# Patient Record
Sex: FEMALE | Race: WHITE
[De-identification: names, ages, dates, MRNs, and addresses within clinical notes are randomized per-mention and may not be internally consistent; named-entity substitution may affect disease eponyms.]

---

## 2022-08-27 ENCOUNTER — HOSPITAL ENCOUNTER (INPATIENT)
Dept: HOSPITAL 12 - ER | Age: 57
LOS: 11 days | Discharge: HOME | DRG: 885 | End: 2022-09-07
Payer: MEDICARE

## 2022-08-27 VITALS — WEIGHT: 135 LBS | BODY MASS INDEX: 22.49 KG/M2 | HEIGHT: 65 IN

## 2022-08-27 DIAGNOSIS — E03.9: ICD-10-CM

## 2022-08-27 DIAGNOSIS — F29: ICD-10-CM

## 2022-08-27 DIAGNOSIS — B36.8: ICD-10-CM

## 2022-08-27 DIAGNOSIS — F17.210: ICD-10-CM

## 2022-08-27 DIAGNOSIS — G43.909: ICD-10-CM

## 2022-08-27 DIAGNOSIS — G89.29: ICD-10-CM

## 2022-08-27 DIAGNOSIS — Z59.00: ICD-10-CM

## 2022-08-27 DIAGNOSIS — F19.10: ICD-10-CM

## 2022-08-27 DIAGNOSIS — F15.10: ICD-10-CM

## 2022-08-27 DIAGNOSIS — I10: ICD-10-CM

## 2022-08-27 DIAGNOSIS — F25.0: Primary | ICD-10-CM

## 2022-08-27 DIAGNOSIS — N17.9: ICD-10-CM

## 2022-08-27 SDOH — ECONOMIC STABILITY - HOUSING INSECURITY: HOMELESSNESS UNSPECIFIED: Z59.00

## 2022-08-28 VITALS — SYSTOLIC BLOOD PRESSURE: 128 MMHG | DIASTOLIC BLOOD PRESSURE: 75 MMHG

## 2022-08-28 VITALS — DIASTOLIC BLOOD PRESSURE: 81 MMHG | SYSTOLIC BLOOD PRESSURE: 117 MMHG

## 2022-08-28 VITALS — SYSTOLIC BLOOD PRESSURE: 102 MMHG | DIASTOLIC BLOOD PRESSURE: 66 MMHG

## 2022-08-28 LAB
ALP SERPL-CCNC: 87 U/L (ref 50–136)
ALT SERPL W/O P-5'-P-CCNC: 24 U/L (ref 14–59)
AST SERPL-CCNC: 24 U/L (ref 15–37)
BILIRUB SERPL-MCNC: 0.2 MG/DL (ref 0.2–1)
BUN SERPL-MCNC: 28 MG/DL (ref 7–18)
CHLORIDE SERPL-SCNC: 101 MMOL/L (ref 98–107)
CO2 SERPL-SCNC: 33 MMOL/L (ref 21–32)
CREAT SERPL-MCNC: 1 MG/DL (ref 0.6–1.3)
GLUCOSE SERPL-MCNC: 99 MG/DL (ref 74–106)
HCT VFR BLD AUTO: 34.6 % (ref 31.2–41.9)
MCH RBC QN AUTO: 29.8 UUG (ref 24.7–32.8)
MCV RBC AUTO: 89.4 FL (ref 75.5–95.3)
PLATELET # BLD AUTO: 221 K/UL (ref 179–408)
POTASSIUM SERPL-SCNC: 3.8 MMOL/L (ref 3.5–5.1)
WS STN SPEC: 7.3 G/DL (ref 6.4–8.2)

## 2022-08-28 RX ADMIN — NICOTINE SCH MG: 21 PATCH, EXTENDED RELEASE TOPICAL at 09:12

## 2022-08-28 RX ADMIN — DIVALPROEX SODIUM SCH MG: 250 TABLET, EXTENDED RELEASE ORAL at 17:00

## 2022-08-28 RX ADMIN — LORAZEPAM PRN MG: 1 TABLET ORAL at 22:05

## 2022-08-28 RX ADMIN — ALUMINUM HYDROXIDE, MAGNESIUM HYDROXIDE, AND SIMETHICONE PRN ML: 200; 200; 20 SUSPENSION ORAL at 14:29

## 2022-08-28 RX ADMIN — LORAZEPAM PRN MG: 1 TABLET ORAL at 09:12

## 2022-08-28 RX ADMIN — ACETAMINOPHEN PRN MG: 325 TABLET ORAL at 22:05

## 2022-08-28 RX ADMIN — LORAZEPAM PRN MG: 1 TABLET ORAL at 14:28

## 2022-08-28 RX ADMIN — OLANZAPINE SCH MG: 5 TABLET ORAL at 17:00

## 2022-08-28 RX ADMIN — LORAZEPAM PRN MG: 1 TABLET ORAL at 01:29

## 2022-08-28 NOTE — NUR
Gps/Lvn- Patient refused to take her depakote  ER, as well as Zyprexa , reviewed with 
patient patient stated" i dont take any of those meds. ". i take lamictal for my mood " .
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
## 2022-08-28 NOTE — NUR
Gps/Lvn- Stayed in the activity room during lunch. Showered self ind. after set up, 
instructed to wash right side of neck wound wit soap and water, rinse well. Complained of 
pain over the right lateral side neck wound, offered tylenol  650 mg po, refused. Tends to 
to be argumentative, prompted to take her meds. Patient was placed to the seclusion  room 
temporarily r/t roommate  with > agitation . Patient claimed she needed to rest. nicotine 
patch  applied to right deltoid area, hesitancy noted.Ativan 1 mg po was administered for 
anxiety .

## 2022-08-28 NOTE — NUR
Gps/Lvn- Able to nap for 2 hours , woke up for lunch , eating large portions of food, 
requesting for more. Emotionally labile crying spells noted while talking to patient.

## 2022-08-28 NOTE — NUR
RECEIVED PATIENT IN HER ROOM SLEEPING IN BED. SHE IS A/O X 2, EASILY AROUSABLE; HOWEVER, SHE 
IS NOTED EASILY IRRITABLE, APPEARS DEPRESSED. SHE IS A POOR HISTORIAN. SHE IS UNCOOPERATIVE 
WITH ASSESSMENT. HER V/S ARE STABLE. SHE IS IN NO DISTRESS. PATIENT IS REASSURED FOR HER 
SAFETY, SAFETY AND FALL PRECAUTION ARE IN PLACE. SHE REFUSED SNACKS AND PO FLUIDS. WILL 
CONTINUE TO MONITOR,

## 2022-08-28 NOTE — NUR
Gps/Lvn- Needy, crying spells, demanding to give her  topamax for her migraine H/A , Sayra 
NP was  texted , awaiting return call. Patient also complained of upset stomach mylanta 30 
ml was given po. Patient constantly talking, hyper verbal. .demanding behavior , difficulty 
redirecting patient. patient requesting to use wheel chair  to roam around claimed back and 
shoulder had been bothering her Patient likes to be called "Rochele"

## 2022-08-28 NOTE — NUR
PATIENT APPROACHED THE NURSING STATION, SHE WAS NOTED CRYING, SHE STATED, "I NEED SOMETHING 
FOR PAIN. IT HURTS ALL OVER". PATIENT WAS REASSURED. SHE WAS GIVEN TYLENOL 650MG PO PRN AND 
ATIVAN 1MG PO PRN. WOUND NOTED ON RIGHT SIDE OF NECK PRESENT ON ADMISSION. NO DISCHARGED, NO 
SWELLING WAS NOTED. AWAITING FOR A WOUND CONSUL. SHE WAS ALSO GIVEN PO FLUIDS AND SNACKS. 
SHE WAS REASSURED FOR HER SAFETY. SAFETY AND FALL PRECAUTIONS ARE IN PLACE. WILL CONTINUE TO 
MONITOR.

## 2022-08-29 VITALS — DIASTOLIC BLOOD PRESSURE: 55 MMHG | SYSTOLIC BLOOD PRESSURE: 135 MMHG

## 2022-08-29 VITALS — DIASTOLIC BLOOD PRESSURE: 79 MMHG | SYSTOLIC BLOOD PRESSURE: 163 MMHG

## 2022-08-29 VITALS — SYSTOLIC BLOOD PRESSURE: 143 MMHG | DIASTOLIC BLOOD PRESSURE: 84 MMHG

## 2022-08-29 RX ADMIN — ARIPIPRAZOLE SCH MG: 5 TABLET ORAL at 13:23

## 2022-08-29 RX ADMIN — DIVALPROEX SODIUM SCH MG: 250 TABLET, EXTENDED RELEASE ORAL at 08:48

## 2022-08-29 RX ADMIN — ALUMINUM HYDROXIDE, MAGNESIUM HYDROXIDE, AND SIMETHICONE PRN ML: 200; 200; 20 SUSPENSION ORAL at 04:09

## 2022-08-29 RX ADMIN — NICOTINE SCH MG: 21 PATCH, EXTENDED RELEASE TOPICAL at 08:48

## 2022-08-29 RX ADMIN — ARIPIPRAZOLE SCH MG: 5 TABLET ORAL at 10:27

## 2022-08-29 RX ADMIN — ARIPIPRAZOLE SCH MG: 5 TABLET ORAL at 17:20

## 2022-08-29 RX ADMIN — OLANZAPINE SCH MG: 5 TABLET ORAL at 08:48

## 2022-08-29 RX ADMIN — LAMOTRIGINE SCH MG: 25 TABLET ORAL at 20:58

## 2022-08-29 NOTE — NUR
RECEIVED PATIENT IN HER ROOM IN BED. SHE IS NOTED SLEEPING BUT EASILY AROUSABLE. SHE IS A/O 
X 2. SHE IS NOTED EASILY IRRITABLE, AFFECT IS BLUNTED, MOOD IS LOW. SHE IS A POOR HISTORIAN. 
PATIENT IS NOTED WITH POOR INSIGHT AND JUDGMENT AS TO THE REASON FOR HER ADMISSION TO [facility]HU. 
SHE WAS ABLE TO COMPLY WITH MEDICATION REGIMENT (LAMICTAL). V/S STABLE. B/P A BIT ELEVATED 
SPB 163MMHG. HOWEVER, PATIENT IS REFUSING TO HAVE HER \B/P RECHECKS AND SHE IS REFUSING 
PRNs. PT IS IN NO DISTRESS. WILL TRY LATER. SHE IS REASSURED FOR HER SAFETY. SAFETY AND FALL 
PRECAUTION ARE IN PLACE. SHE WAS GIVEN PO FLUIDS AND SNACKS. WILL CONTINUE TO MONITOR 
CLOSELY,

## 2022-08-29 NOTE — NUR
GPS: Nursing Notes: Mood Disturbance: Labile:

Patient is awake and responding to her name, refusing her medications this AM, poor anger 
management, unpredictable behavior, shouting to staff "I will take what the fuck I want..I 
don't want those pills..", redirected during shift, gets easily irritable and angry when 
redirected, poor impulse control, argumentative, resistant with nursing care, episodes of 
pacing the hallway, verbal abusive toward staff at times, continue to monitor for safety, 
continue with treatment plan.

## 2022-08-29 NOTE — NUR
GPS: Nursing Notes: 5250 Hearing Request:

Staff gave copy of 5250 to patient. Explained 5250 and informed patient that a certification 
review hearing will held within four days. Patient was inform that patient's right advocate 
will talk to her to provide assistance in preparing for the hearing or to answer questions 
or to provide other assistance. The court has been notified of this certification on this 
day via [facility] portal.

## 2022-08-30 VITALS — DIASTOLIC BLOOD PRESSURE: 77 MMHG | SYSTOLIC BLOOD PRESSURE: 118 MMHG

## 2022-08-30 VITALS — SYSTOLIC BLOOD PRESSURE: 137 MMHG | DIASTOLIC BLOOD PRESSURE: 84 MMHG

## 2022-08-30 VITALS — SYSTOLIC BLOOD PRESSURE: 111 MMHG | DIASTOLIC BLOOD PRESSURE: 68 MMHG

## 2022-08-30 RX ADMIN — LORAZEPAM PRN MG: 1 TABLET ORAL at 15:28

## 2022-08-30 RX ADMIN — ARIPIPRAZOLE SCH MG: 5 TABLET ORAL at 17:07

## 2022-08-30 RX ADMIN — LORAZEPAM PRN MG: 1 TABLET ORAL at 08:24

## 2022-08-30 RX ADMIN — CLOTRIMAZOLE AND BETAMETHASONE DIPROPIONATE SCH GM: 10; .5 CREAM TOPICAL at 21:00

## 2022-08-30 RX ADMIN — ARIPIPRAZOLE SCH MG: 5 TABLET ORAL at 12:33

## 2022-08-30 RX ADMIN — NICOTINE SCH MG: 21 PATCH, EXTENDED RELEASE TOPICAL at 08:24

## 2022-08-30 RX ADMIN — CLOTRIMAZOLE AND BETAMETHASONE DIPROPIONATE SCH GM: 10; .5 CREAM TOPICAL at 12:35

## 2022-08-30 RX ADMIN — LAMOTRIGINE SCH MG: 25 TABLET ORAL at 22:01

## 2022-08-30 RX ADMIN — ARIPIPRAZOLE SCH MG: 5 TABLET ORAL at 08:24

## 2022-08-30 RX ADMIN — ACETAMINOPHEN PRN MG: 325 TABLET ORAL at 07:24

## 2022-08-30 NOTE — NUR
ASAD Initial Discharge Plan:



Pt does not have an address available at this time. ASAD will contact pt's , Morris 
([phone]) and discuss pt's discharge plan. ASAD will 

continue to work with pt, family and MD to ensure a safe and proper discharge plan.

## 2022-08-30 NOTE — NUR
WOUND CARE CONSULT; PT SEEN FOR LESION TO RT SIDE OF NECK WITH PEELING SKIN, PRESENT ON 
ADMISSION. PT REPORTS ITCHING TO AREA. RECOMMENDATIONS MADE FOR SKIN CARE OF LESION. 
DISCUSSED WITH NURSING STAFF. MD IN AGREEMENT WITH PLAN OF CARE.

## 2022-08-31 VITALS — DIASTOLIC BLOOD PRESSURE: 71 MMHG | SYSTOLIC BLOOD PRESSURE: 122 MMHG

## 2022-08-31 VITALS — SYSTOLIC BLOOD PRESSURE: 132 MMHG | DIASTOLIC BLOOD PRESSURE: 69 MMHG

## 2022-08-31 VITALS — SYSTOLIC BLOOD PRESSURE: 145 MMHG | DIASTOLIC BLOOD PRESSURE: 82 MMHG

## 2022-08-31 RX ADMIN — LORAZEPAM PRN MG: 1 TABLET ORAL at 12:51

## 2022-08-31 RX ADMIN — ARIPIPRAZOLE SCH MG: 5 TABLET ORAL at 17:08

## 2022-08-31 RX ADMIN — LORAZEPAM PRN MG: 1 TABLET ORAL at 06:18

## 2022-08-31 RX ADMIN — CLOTRIMAZOLE AND BETAMETHASONE DIPROPIONATE SCH GM: 10; .5 CREAM TOPICAL at 08:27

## 2022-08-31 RX ADMIN — ARIPIPRAZOLE SCH MG: 5 TABLET ORAL at 08:25

## 2022-08-31 RX ADMIN — CLOTRIMAZOLE AND BETAMETHASONE DIPROPIONATE SCH GM: 10; .5 CREAM TOPICAL at 20:20

## 2022-08-31 RX ADMIN — ARIPIPRAZOLE SCH MG: 5 TABLET ORAL at 12:47

## 2022-08-31 RX ADMIN — LAMOTRIGINE SCH MG: 25 TABLET ORAL at 20:20

## 2022-08-31 RX ADMIN — NICOTINE SCH MG: 21 PATCH, EXTENDED RELEASE TOPICAL at 08:25

## 2022-08-31 NOTE — NUR
PATIENT'S RASH/LESION ON RT SIDE OF NECK WITH PEELING SKIN, PRESENT ON ADMISSION, IS BEEN 
TREATED WITH LOTRISONE CREAM. IT APPEARS TO BE HEALING WELL. PATIENT DENIED ITCHING, PAIN OR 
DISCOMFORT. NO SWELLING NO DISCHARGED AND NO S/S OF INFECTION IS NOTED AT THIS TIME. PATIENT 
IS COMPLIANT WITH LOTRISONE CREAM Q12HRS. SHE TOLERATED WELL/ WILL CONTINUE TO MONITOR.

## 2022-08-31 NOTE — NUR
RECEIVED PATIENT IN HER ROOM IN BED. SHE IS NOTED SLEEPING BUT EASILY AROUSABLE. SHE IS 
NOTED A/O X 2. PATIENT APPEARS DEPRESSED. UPON INTERVIEW, SHE DENIED SI/HI/VI/AH SHE WAS 
ABLE TO VERBALLY CFS. SHE STATED, "I DON'T WANT TO HARM MYSELF OR DIE, I JUST WANT TO SLEEP. 
I AM TIRED, I JUST WANT TO SLEEP". PATIENT WAS REASSURED FOR HER SAFETY. SAFETY AND FALL 
PRECAUTIONS ARE IN PLACE. SHE WAS GIVEN PO FLUIDS AND SNACKS. HER V/S ARE STABLE. SHE IS IN 
NO DISTRESS. WILL CONTINUE TO MONITOR.

## 2022-09-01 VITALS — DIASTOLIC BLOOD PRESSURE: 76 MMHG | SYSTOLIC BLOOD PRESSURE: 124 MMHG

## 2022-09-01 VITALS — SYSTOLIC BLOOD PRESSURE: 118 MMHG | DIASTOLIC BLOOD PRESSURE: 72 MMHG

## 2022-09-01 VITALS — SYSTOLIC BLOOD PRESSURE: 110 MMHG | DIASTOLIC BLOOD PRESSURE: 75 MMHG

## 2022-09-01 RX ADMIN — CLOTRIMAZOLE AND BETAMETHASONE DIPROPIONATE SCH GM: 10; .5 CREAM TOPICAL at 20:42

## 2022-09-01 RX ADMIN — Medication SCH ML: at 12:17

## 2022-09-01 RX ADMIN — ARIPIPRAZOLE SCH MG: 5 TABLET ORAL at 12:55

## 2022-09-01 RX ADMIN — LORAZEPAM PRN MG: 1 TABLET ORAL at 10:27

## 2022-09-01 RX ADMIN — CLOTRIMAZOLE AND BETAMETHASONE DIPROPIONATE SCH GM: 10; .5 CREAM TOPICAL at 09:24

## 2022-09-01 RX ADMIN — ARIPIPRAZOLE SCH MG: 5 TABLET ORAL at 16:49

## 2022-09-01 RX ADMIN — LAMOTRIGINE SCH MG: 25 TABLET ORAL at 20:42

## 2022-09-01 RX ADMIN — ARIPIPRAZOLE SCH MG: 5 TABLET ORAL at 09:21

## 2022-09-01 RX ADMIN — NICOTINE SCH MG: 21 PATCH, EXTENDED RELEASE TOPICAL at 09:21

## 2022-09-01 NOTE — NUR
Gps/Lvn- Lesions/rashes ro right side of her neck improving, granulations noted, antifungal 
cream applied as ordered, compliant with meds, making her simple needs known, cooperative, 
rediirectable, encouraged continue participation in her group tx. interacting with her 
roommate

## 2022-09-02 VITALS — SYSTOLIC BLOOD PRESSURE: 121 MMHG | DIASTOLIC BLOOD PRESSURE: 64 MMHG

## 2022-09-02 VITALS — DIASTOLIC BLOOD PRESSURE: 71 MMHG | SYSTOLIC BLOOD PRESSURE: 108 MMHG

## 2022-09-02 RX ADMIN — CLOTRIMAZOLE AND BETAMETHASONE DIPROPIONATE SCH GM: 10; .5 CREAM TOPICAL at 20:19

## 2022-09-02 RX ADMIN — CLOTRIMAZOLE AND BETAMETHASONE DIPROPIONATE SCH GM: 10; .5 CREAM TOPICAL at 08:56

## 2022-09-02 RX ADMIN — NICOTINE SCH MG: 21 PATCH, EXTENDED RELEASE TOPICAL at 08:55

## 2022-09-02 RX ADMIN — ARIPIPRAZOLE SCH MG: 5 TABLET ORAL at 17:03

## 2022-09-02 RX ADMIN — LAMOTRIGINE SCH MG: 25 TABLET ORAL at 20:18

## 2022-09-02 RX ADMIN — ARIPIPRAZOLE SCH MG: 5 TABLET ORAL at 12:12

## 2022-09-02 RX ADMIN — Medication SCH ML: at 08:54

## 2022-09-02 RX ADMIN — ARIPIPRAZOLE SCH MG: 5 TABLET ORAL at 08:53

## 2022-09-02 NOTE — NUR
Received patient in her room sleeping, easily arousable to her name. Patient remains in her 
bed sleeping during shift. Checked patient from time to time, patient states she is "tired". 
Refused care in her neck. Compliant with medication. Offered snacks but refused. Safety 
precaution in place.

## 2022-09-02 NOTE — NUR
Gps/Lvn- Stayed in her room most of the day napping. denies  any discomfort, . resolving 
redness right side of neck rash/lesions. Denies any discomfort, encouraged participations in 
her group therapy, verbalizes simple needs and feelings.

## 2022-09-03 VITALS — DIASTOLIC BLOOD PRESSURE: 57 MMHG | SYSTOLIC BLOOD PRESSURE: 116 MMHG

## 2022-09-03 VITALS — SYSTOLIC BLOOD PRESSURE: 103 MMHG | DIASTOLIC BLOOD PRESSURE: 55 MMHG

## 2022-09-03 VITALS — SYSTOLIC BLOOD PRESSURE: 120 MMHG | DIASTOLIC BLOOD PRESSURE: 70 MMHG

## 2022-09-03 RX ADMIN — CLOTRIMAZOLE AND BETAMETHASONE DIPROPIONATE SCH GM: 10; .5 CREAM TOPICAL at 08:38

## 2022-09-03 RX ADMIN — NICOTINE SCH MG: 21 PATCH, EXTENDED RELEASE TOPICAL at 08:37

## 2022-09-03 RX ADMIN — LAMOTRIGINE SCH MG: 25 TABLET ORAL at 20:08

## 2022-09-03 RX ADMIN — CLOTRIMAZOLE AND BETAMETHASONE DIPROPIONATE SCH GM: 10; .5 CREAM TOPICAL at 20:26

## 2022-09-03 RX ADMIN — ARIPIPRAZOLE SCH MG: 5 TABLET ORAL at 08:37

## 2022-09-03 RX ADMIN — Medication SCH ML: at 08:38

## 2022-09-03 RX ADMIN — LORAZEPAM PRN MG: 1 TABLET ORAL at 12:17

## 2022-09-03 RX ADMIN — ARIPIPRAZOLE SCH MG: 5 TABLET ORAL at 17:26

## 2022-09-03 RX ADMIN — ARIPIPRAZOLE SCH MG: 5 TABLET ORAL at 12:17

## 2022-09-04 VITALS — DIASTOLIC BLOOD PRESSURE: 72 MMHG | SYSTOLIC BLOOD PRESSURE: 109 MMHG

## 2022-09-04 VITALS — DIASTOLIC BLOOD PRESSURE: 49 MMHG | SYSTOLIC BLOOD PRESSURE: 90 MMHG

## 2022-09-04 VITALS — DIASTOLIC BLOOD PRESSURE: 61 MMHG | SYSTOLIC BLOOD PRESSURE: 112 MMHG

## 2022-09-04 RX ADMIN — CLOTRIMAZOLE AND BETAMETHASONE DIPROPIONATE SCH GM: 10; .5 CREAM TOPICAL at 09:37

## 2022-09-04 RX ADMIN — Medication SCH ML: at 09:33

## 2022-09-04 RX ADMIN — CLOTRIMAZOLE AND BETAMETHASONE DIPROPIONATE SCH GM: 10; .5 CREAM TOPICAL at 21:32

## 2022-09-04 RX ADMIN — ARIPIPRAZOLE SCH MG: 5 TABLET ORAL at 16:07

## 2022-09-04 RX ADMIN — ARIPIPRAZOLE SCH MG: 5 TABLET ORAL at 12:52

## 2022-09-04 RX ADMIN — ARIPIPRAZOLE SCH MG: 5 TABLET ORAL at 09:09

## 2022-09-04 RX ADMIN — LAMOTRIGINE SCH MG: 25 TABLET ORAL at 21:32

## 2022-09-04 RX ADMIN — NICOTINE SCH MG: 21 PATCH, EXTENDED RELEASE TOPICAL at 09:09

## 2022-09-04 RX ADMIN — LORAZEPAM PRN MG: 1 TABLET ORAL at 06:33

## 2022-09-04 RX ADMIN — LORAZEPAM PRN MG: 1 TABLET ORAL at 16:07

## 2022-09-04 NOTE — NUR
GPS NOTES: Patient stays in bed most of the shift, dozing intermittently. Patient is A&Ox2, 
interact only when engaged. Remains med compliant. Come out of her room x1 to ask for water, 
then went back to her room. She slept well during shift with no issues noted. Frequent 
monitoring observed for safety.

## 2022-09-04 NOTE — NUR
GPS: Nursing Notes: Mood Disturbance: Labile:

Patient is awake and responding to her name, isolative and withdrawn in her room, compliant 
with her medications, believes that she is getting better, encouraged to participate in 
therapeutic groups, A/Ox3, cooperative with nursing care, unable to formulate a viable plan 
for self care, continue to monitor for safety, minimal participation in therapeutic groups, 
continue with treatment plan.

## 2022-09-04 NOTE — NUR
RECEIVED PATIENT IN HER ROOM IN BED. SHE IS NOTED SLEEPING BUT EASILY AROUSABLE. PATIENT IS 
NOTED A/O X 2. SHE IS SOMEWHAT BETTER/ SHE APPEARS DEPRESSED, SHE IS ISOLATIVE AND 
WITHDRAWN. HOWEVER, SHE DENIED SI/HI/VH/AH. SHE IS ABLE TO CFS. SHE IS COMPLIANT WITH CARE. 
HER V/S ARE STABLE. SHE IS IN NO DISTRESS. SHE WAS GIVEN PO FLUIDS AND SNACKS. SAFETY AND 
FALL PRECAUTIONS ARE IN PLACE. PATIENT IS REASSURE FOR HER SAFETY. WILL CONTINUE TO MONITOR.

## 2022-09-05 VITALS — DIASTOLIC BLOOD PRESSURE: 47 MMHG | SYSTOLIC BLOOD PRESSURE: 93 MMHG

## 2022-09-05 VITALS — DIASTOLIC BLOOD PRESSURE: 64 MMHG | SYSTOLIC BLOOD PRESSURE: 121 MMHG

## 2022-09-05 VITALS — DIASTOLIC BLOOD PRESSURE: 56 MMHG | SYSTOLIC BLOOD PRESSURE: 116 MMHG

## 2022-09-05 RX ADMIN — CLOTRIMAZOLE AND BETAMETHASONE DIPROPIONATE SCH GM: 10; .5 CREAM TOPICAL at 20:36

## 2022-09-05 RX ADMIN — CLOTRIMAZOLE AND BETAMETHASONE DIPROPIONATE SCH GM: 10; .5 CREAM TOPICAL at 08:38

## 2022-09-05 RX ADMIN — LAMOTRIGINE SCH MG: 25 TABLET ORAL at 20:36

## 2022-09-05 RX ADMIN — NICOTINE SCH MG: 21 PATCH, EXTENDED RELEASE TOPICAL at 08:37

## 2022-09-05 RX ADMIN — ARIPIPRAZOLE SCH MG: 10 TABLET ORAL at 13:33

## 2022-09-05 RX ADMIN — ARIPIPRAZOLE SCH MG: 5 TABLET ORAL at 08:37

## 2022-09-05 RX ADMIN — ARIPIPRAZOLE SCH MG: 10 TABLET ORAL at 16:45

## 2022-09-05 RX ADMIN — Medication SCH ML: at 08:37

## 2022-09-05 RX ADMIN — LORAZEPAM PRN MG: 1 TABLET ORAL at 07:31

## 2022-09-05 NOTE — NUR
GPS: Nursing Notes: Mood Disturbance: Labile:

Patient is awake and responding to her name, isolative and withdrawn in her room, A/Ox3, 
cooperative with nursing care, compliant with her medications, needs prompting to 
participate in therapeutic groups, believes that she is getting better, but unable to 
formulate a viable plan for self care, continue to monitor for safety, continue with 
treatment plan.

## 2022-09-06 VITALS — DIASTOLIC BLOOD PRESSURE: 53 MMHG | SYSTOLIC BLOOD PRESSURE: 97 MMHG

## 2022-09-06 VITALS — SYSTOLIC BLOOD PRESSURE: 102 MMHG | DIASTOLIC BLOOD PRESSURE: 52 MMHG

## 2022-09-06 VITALS — DIASTOLIC BLOOD PRESSURE: 56 MMHG | SYSTOLIC BLOOD PRESSURE: 101 MMHG

## 2022-09-06 RX ADMIN — NICOTINE SCH MG: 21 PATCH, EXTENDED RELEASE TOPICAL at 08:36

## 2022-09-06 RX ADMIN — ARIPIPRAZOLE SCH MG: 10 TABLET ORAL at 12:22

## 2022-09-06 RX ADMIN — LORAZEPAM PRN MG: 1 TABLET ORAL at 06:21

## 2022-09-06 RX ADMIN — LORAZEPAM PRN MG: 1 TABLET ORAL at 14:00

## 2022-09-06 RX ADMIN — LAMOTRIGINE SCH MG: 25 TABLET ORAL at 20:34

## 2022-09-06 RX ADMIN — CLOTRIMAZOLE AND BETAMETHASONE DIPROPIONATE SCH GM: 10; .5 CREAM TOPICAL at 08:37

## 2022-09-06 RX ADMIN — Medication SCH ML: at 08:36

## 2022-09-06 RX ADMIN — CLOTRIMAZOLE AND BETAMETHASONE DIPROPIONATE SCH APPLIC: 10; .5 CREAM TOPICAL at 20:34

## 2022-09-06 RX ADMIN — ARIPIPRAZOLE SCH MG: 10 TABLET ORAL at 08:36

## 2022-09-06 RX ADMIN — ARIPIPRAZOLE SCH MG: 10 TABLET ORAL at 17:09

## 2022-09-06 NOTE — NUR
ASAD Family Contact:



ASAD spoke with pt's , Morris ([phone]) and discuss pt's discharge plan for Wednesday 
to return home under his care with transportation provided. Morris is aware and agreeable and 
stated he will contact this writer back to confirm a time.

## 2022-09-06 NOTE — NUR
GPS: Nursing Notes: Mood Disturbance: Labile:

Patient is awake and responding to her name, cooperative with nursing care, compliant with 
her medications, isolative and withdrawn in her room at times, needs minimal prompting to 
participate in therapeutic groups, gets easily anxious at times, unable to formulate a 
viable plan for self care, no episodes of agitations, believes that she is getting better, 
continue to monitor for safety, continue with treatment plan.

## 2022-09-07 VITALS — DIASTOLIC BLOOD PRESSURE: 73 MMHG | SYSTOLIC BLOOD PRESSURE: 121 MMHG

## 2022-09-07 RX ADMIN — Medication SCH ML: at 09:07

## 2022-09-07 RX ADMIN — CLOTRIMAZOLE AND BETAMETHASONE DIPROPIONATE SCH APPLIC: 10; .5 CREAM TOPICAL at 09:00

## 2022-09-07 RX ADMIN — ARIPIPRAZOLE SCH MG: 10 TABLET ORAL at 12:15

## 2022-09-07 RX ADMIN — NICOTINE SCH MG: 21 PATCH, EXTENDED RELEASE TOPICAL at 09:07

## 2022-09-07 RX ADMIN — LORAZEPAM PRN MG: 1 TABLET ORAL at 06:06

## 2022-09-07 RX ADMIN — ARIPIPRAZOLE SCH MG: 10 TABLET ORAL at 09:07

## 2022-09-07 NOTE — NUR
received patient in her room in bed. She is noted A/O x 3. she appears depressed. she is 
withdrawn; however, she denied SI/VH/AH/HI. she is able to cfs. She refused nicotine patch. 
patient stated to this writer, "i don't need it today because i am going home today". she is 
reassured for her safety. will continue to monitor.

## 2022-09-07 NOTE — NUR
Pt cooperative, compliant with medications. Slept throughout the night. No distress noted. 
Denies SI or HI. Able to verbalize all needs. Safety maintained throughout the night, will 
endorse to day shift.

## 2022-09-07 NOTE — NUR
received patient in her room in bed. She is noted A/O x 3. she appears depressed. she is 
withdrawn; however, she denied SI/VH/AH/HI. she is able to cfs. She refused nicotine patch. 
patient stated to this writer, "i don't need it today because i am going home today".

## 2022-09-07 NOTE — NUR
Received orders to discharge this patient to Home located at [address] [place] [address] via pts husbands private vehicle transportation at 1PM. Pt is aware and 
agreeable with discharge plan. Patient denies SI/HI AH/VH, SOB, pain or any discomfort. 
Medications, belongings, and valuables returned to patient and all discharge documents were 
signed. Patient left the unit at 12:30 pm. Emotional support provided. Fall and safety 
precautions implemented.

## 2022-09-07 NOTE — NUR
ASAD Discharge Note:



Pt will be discharged to Home located at [address] [place] [address] via pts 
husbands private vehicle transportation at 1PM. ASAD spoke with pts , Morris 
([phone]) who states they are ready to accept the patient today. Pt is aware and 
agreeable with discharge plan. Pt is alert and oriented x4, is unable to plan for self-care 
at this time. However, pt is willing to accept care at home under the care of her . 
Pt denies any suicidal or homicidal ideation. Pt will follow-up with her outpatient 
Psychiatrist and psychologist who will be assigned at [facility] via 
teletherapy ([phone]) after pts consultation on Wednesday, September 14th at 9AM 
confirmed by Kaila. Pt will follow up with her outpatient Internist. Pt presents with calm 
mood and congruent affect. PHARMACY: [address] [address] 
([phone]).

## 2025-03-19 NOTE — NUR
"Chief Complaint  Nausea (X 1 week - increase dose of meds?)    Subjective        Glenda Brown presents to [facility]  History of Present Illness    History of Present Illness  74-year-old female presents with her daughter this date.  Patient reports she has been having nausea for about a year now.  States she was hospitalized last year and testing revealed gastritis.  Patient reports she was discharged and advised to follow-up with hematology reports they found vitamin B deficiency otherwise normal.  Reports vitamin B injections did help with her nausea for a few months.  Patient reports since then she continues to have nausea every day.  Patient states for the past week it has been increasing.  She denies any abdominal pain or vomiting.  Denies any blood in her stool.  Reports she has been taking Phenergan however no relief.  Patient states she was prescribed Zofran dissolvable tablets and she has been unable to take them secondary to the taste.      Patient reports she continues to have a low appetite.  States she took Remeron in the past however no relief of anorexia states she was switched to Megace.  Patient states it was not working so she discontinued the medication months ago.  Patient states she does not feel that her nausea and lack of appetite is secondary to mood however she is not taking any mood medications at this time.  Patient reports history of anxiety and depression.  Patient reports she does not have any acid reflux reports history of GERD however no current medication.    Reports having elevated cholesterol states she needs a refill of rosuvastatin.            Objective   Vital Signs:  /68   Pulse 80   Ht 162.6 cm (64\")   Wt 45.7 kg (100 lb 11.2 oz)   SpO2 97%   BMI 17.29 kg/m²   Estimated body mass index is 17.29 kg/m² as calculated from the following:    Height as of this encounter: 162.6 cm (64\").    Weight as of this encounter: 45.7 kg (100 lb " GPS: Nursing Notes: Mood Disturbance: Labile:

Patient is awake and responding to her name, compliant with her medications, cooperative 
with nursing care, isolative and withdrawn in her room, needs prompting to participate in 
therapeutic groups, labile at times, but following directions from staff, unable to 
formulate a viable plan for self care, continue to monitor for safety, continue with 
treatment plan. 11.2 oz).          Physical Exam   Result Review :                Assessment and Plan   Diagnoses and all orders for this visit:    1. Nausea (Primary)  -     promethazine (PHENERGAN) 25 MG tablet; Take 1-2 tabs po Tid as needed for nausea, caution sedation  Dispense: 90 tablet; Refill: 5  -     ondansetron (Zofran) 4 MG tablet; Take 1 tablet by mouth Every 8 (Eight) Hours As Needed for Nausea or Vomiting.  Dispense: 30 tablet; Refill: 5    2. Acquired hypothyroidism  -     TSH; Future  -     TSH    3. Vitamin B deficiency  -     Ambulatory Referral to Gastroenterology    4. Hyperlipidemia LDL goal <100  -     rosuvastatin (CRESTOR) 20 MG tablet; Take 1 tablet by mouth Daily. For cholesterol  Dispense: 90 tablet; Refill: 1    5. Chronic nausea  -     Ambulatory Referral to Gastroenterology  -     omeprazole (priLOSEC) 20 MG capsule; Take 1 capsule by mouth Daily. For acid reflux  Dispense: 30 capsule; Refill: 3    6. Underweight (BMI < 18.5)  -     Ambulatory Referral to Gastroenterology             Assessment & Plan  1. Chronic nausea.  Experiencing increased frequency and intensity of chronic nausea for a few weeks, unrelieved by Phenergan at her current dose.  Will increase Phenergan to 1 to 2 tablets 3 times a day and change Zofran to tablets rather than sublingual.  Will prescribe omeprazole for patient to start to take for possible relief.  Provided patient with referral to gastroenterology for further workup.      2. Thyroid management.  Order thyroid function test. Adjust levothyroxine dosage based on test results. If no adjustment needed, refill at 75 mcg.    3. Cholesterol management.  Refill rosuvastatin prescription.    4.  Underweight  Discussed with patient the importance of regular meals and snacks.  Recommended she consider restarting Megace or Remeron.    PROCEDURE  Bone marrow biopsy by oncologist normal.      Follow Up   No follow-ups on file.  Patient was given instructions and counseling  regarding her condition or for health maintenance advice. Please see specific information pulled into the AVS if appropriate.     Patient or patient representative verbalized consent for the use of Ambient Listening during the visit with  Cheryl Joseph PA-C for chart documentation. 3/19/2025  16:22 EDT